# Patient Record
Sex: FEMALE | Race: WHITE
[De-identification: names, ages, dates, MRNs, and addresses within clinical notes are randomized per-mention and may not be internally consistent; named-entity substitution may affect disease eponyms.]

---

## 2019-06-02 ENCOUNTER — HOSPITAL ENCOUNTER (EMERGENCY)
Dept: HOSPITAL 94 - ER | Age: 59
LOS: 1 days | Discharge: HOME | End: 2019-06-03
Payer: COMMERCIAL

## 2019-06-02 VITALS — HEIGHT: 65 IN | WEIGHT: 185.19 LBS | BODY MASS INDEX: 30.85 KG/M2

## 2019-06-02 DIAGNOSIS — Z98.890: ICD-10-CM

## 2019-06-02 DIAGNOSIS — Z88.8: ICD-10-CM

## 2019-06-02 DIAGNOSIS — J06.9: ICD-10-CM

## 2019-06-02 DIAGNOSIS — J45.909: ICD-10-CM

## 2019-06-02 DIAGNOSIS — Z88.5: ICD-10-CM

## 2019-06-02 DIAGNOSIS — Z79.899: ICD-10-CM

## 2019-06-02 DIAGNOSIS — Z91.013: ICD-10-CM

## 2019-06-02 DIAGNOSIS — I10: ICD-10-CM

## 2019-06-02 DIAGNOSIS — Z90.710: ICD-10-CM

## 2019-06-02 DIAGNOSIS — J20.9: Primary | ICD-10-CM

## 2019-06-02 PROCEDURE — 71045 X-RAY EXAM CHEST 1 VIEW: CPT

## 2019-06-02 PROCEDURE — 94640 AIRWAY INHALATION TREATMENT: CPT

## 2019-06-02 PROCEDURE — 93005 ELECTROCARDIOGRAM TRACING: CPT

## 2019-06-02 PROCEDURE — 94644 CONT INHLJ TX 1ST HOUR: CPT

## 2019-06-02 PROCEDURE — 99285 EMERGENCY DEPT VISIT HI MDM: CPT

## 2019-06-02 PROCEDURE — 94760 N-INVAS EAR/PLS OXIMETRY 1: CPT

## 2019-06-02 NOTE — NUR
Patient brought to room and is sitting on gurney,  is present. She 
reports hx of asthma and today has been rough with tightness and trouble 
breathing.

## 2019-06-03 VITALS — DIASTOLIC BLOOD PRESSURE: 90 MMHG | SYSTOLIC BLOOD PRESSURE: 165 MMHG
